# Patient Record
Sex: FEMALE | Race: WHITE
[De-identification: names, ages, dates, MRNs, and addresses within clinical notes are randomized per-mention and may not be internally consistent; named-entity substitution may affect disease eponyms.]

---

## 2018-04-12 ENCOUNTER — HOSPITAL ENCOUNTER (OUTPATIENT)
Dept: HOSPITAL 17 - CPRE | Age: 58
End: 2018-04-12
Attending: OBSTETRICS & GYNECOLOGY
Payer: COMMERCIAL

## 2018-04-12 DIAGNOSIS — Z01.812: ICD-10-CM

## 2018-04-12 DIAGNOSIS — Z01.810: Primary | ICD-10-CM

## 2018-04-12 DIAGNOSIS — R94.31: ICD-10-CM

## 2018-04-12 DIAGNOSIS — N81.5: ICD-10-CM

## 2018-04-12 LAB
ALBUMIN SERPL-MCNC: 4 GM/DL (ref 3.4–5)
ALP SERPL-CCNC: 87 U/L (ref 45–117)
ALT SERPL-CCNC: 31 U/L (ref 10–53)
AST SERPL-CCNC: 20 U/L (ref 15–37)
BACTERIA #/AREA URNS HPF: (no result) /HPF
BASOPHILS # BLD AUTO: 0 TH/MM3 (ref 0–0.2)
BASOPHILS NFR BLD: 0.4 % (ref 0–2)
BILIRUB SERPL-MCNC: 0.7 MG/DL (ref 0.2–1)
BUN SERPL-MCNC: 14 MG/DL (ref 7–18)
CALCIUM SERPL-MCNC: 9.5 MG/DL (ref 8.5–10.1)
CHLORIDE SERPL-SCNC: 103 MEQ/L (ref 98–107)
COLOR UR: (no result)
CREAT SERPL-MCNC: 0.71 MG/DL (ref 0.5–1)
EOSINOPHIL # BLD: 0.1 TH/MM3 (ref 0–0.4)
EOSINOPHIL NFR BLD: 2 % (ref 0–4)
ERYTHROCYTE [DISTWIDTH] IN BLOOD BY AUTOMATED COUNT: 12.8 % (ref 11.6–17.2)
GFR SERPLBLD BASED ON 1.73 SQ M-ARVRAT: 84 ML/MIN (ref 89–?)
GLUCOSE UR STRIP-MCNC: (no result) MG/DL
HCO3 BLD-SCNC: 31 MEQ/L (ref 21–32)
HCT VFR BLD CALC: 42.6 % (ref 35–46)
HGB BLD-MCNC: 14.5 GM/DL (ref 11.6–15.3)
HGB UR QL STRIP: (no result)
KETONES UR STRIP-MCNC: (no result) MG/DL
LYMPHOCYTES # BLD AUTO: 2.8 TH/MM3 (ref 1–4.8)
LYMPHOCYTES NFR BLD AUTO: 40.3 % (ref 9–44)
MCH RBC QN AUTO: 28.7 PG (ref 27–34)
MCHC RBC AUTO-ENTMCNC: 34.1 % (ref 32–36)
MCV RBC AUTO: 84.3 FL (ref 80–100)
MONOCYTE #: 0.3 TH/MM3 (ref 0–0.9)
MONOCYTES NFR BLD: 5.1 % (ref 0–8)
NEUTROPHILS # BLD AUTO: 3.6 TH/MM3 (ref 1.8–7.7)
NEUTROPHILS NFR BLD AUTO: 52.2 % (ref 16–70)
NITRITE UR QL STRIP: (no result)
PLATELET # BLD: 245 TH/MM3 (ref 150–450)
PMV BLD AUTO: 8.6 FL (ref 7–11)
PROT SERPL-MCNC: 7.9 GM/DL (ref 6.4–8.2)
RBC # BLD AUTO: 5.05 MIL/MM3 (ref 4–5.3)
SODIUM SERPL-SCNC: 140 MEQ/L (ref 136–145)
SP GR UR STRIP: 1 (ref 1–1.03)
SQUAMOUS #/AREA URNS HPF: 2 /HPF (ref 0–5)
URINE LEUKOCYTE ESTERASE: (no result)
WBC # BLD AUTO: 6.9 TH/MM3 (ref 4–11)

## 2018-04-12 PROCEDURE — 80053 COMPREHEN METABOLIC PANEL: CPT

## 2018-04-12 PROCEDURE — 85025 COMPLETE CBC W/AUTO DIFF WBC: CPT

## 2018-04-12 PROCEDURE — 93005 ELECTROCARDIOGRAM TRACING: CPT

## 2018-04-12 PROCEDURE — 36415 COLL VENOUS BLD VENIPUNCTURE: CPT

## 2018-04-12 PROCEDURE — 81001 URINALYSIS AUTO W/SCOPE: CPT

## 2018-04-12 NOTE — MH
cc:

Pedro Ramirez MD

****

 

 

DATE OF ADMISSION:

2018

 

REASON FOR ADMISSION:

Scheduled for admission on 2018 for posterior repair.

 

HISTORY OF PRESENT ILLNESS:

The patient is a 58-year-old white female,  4, para 4, status 

post prior hysterectomy and repair who has had issues with recurrent 

pelvic organ prolapse.  She typically has pressure and discomfort with

a bulge sensation.  She has stage II pelvic prolapse and wants to 

proceed with surgical correction.

 

PAST MEDICAL HISTORY:

Negative for heart, lung, liver disease, hypertension, diabetes or 

stroke.

 

PAST SURGICAL HISTORY:

Hysterectomy with repair.

 

OB HISTORY:

Four vaginal deliveries.

 

GYN HISTORY:

No STDs or abnormal Pap smears.  Hysterectomy was for a benign 

condition.

 

SOCIAL HISTORY:

She does not smoke, use alcohol or drugs.

 

FAMILY HISTORY:

Noncontributory.

 

ALLERGIES:

LATEX.

 

REVIEW OF SYSTEMS:

As above.  No chest pain, orthopnea or PND.  No nausea, vomiting, 

fever or chills.  No vaginal bleeding or discharge.

 

PHYSICAL EXAMINATION:

On exam, she is afebrile.  Vital signs are stable.  Height is 5 feet 6

inches, weight 250, BMI is 40.  Blood pressure is 120/70.

GENERAL:  The patient is alert and oriented in no acute distress.  No 

sign of cognitive dysfunction or depression.

HEENT:  Within normal limits.

NECK:  Supple.  No JVD.

CHEST:  Clear.

HEART:  Regular rate and rhythm.

ABDOMEN:  Soft, nontender.  No hepatosplenomegaly.  No CVA tenderness.

PELVIC:  Exam in the office shows Ap is at 0.  Further exam under 

anesthesia.

EXTREMITIES:  Warm.

SKIN:  Without rashes.

NEUROLOGIC:  Nonfocal.  No DVT signs.

 

ASSESSMENT:

Patient with pelvic organ prolapse stage II posterior compartment.

 

PLAN:

We discussed options for management and treatment.  She is aware of 

the risks, benefits and alternatives of the planned procedure 

including damage to surrounding organs, bleeding, infection, 

postoperative dyspareunia, damage to surrounding organs, failure of 

procedure.  She has made an informed choice to proceed.  Anticipate 

outpatient procedure.  We will use DVT prophylaxis with sequential 

compression device and antibiotic prophylaxis with Ancef 2 grams.  

Anticipate outpatient procedure.

 

 

 

 

__________________________________

MD RENITA Denton/DL

D: 2018, 10:42 AM

T: 2018, 11:04 AM

Visit #: [id number]

Job #: [id number]

## 2018-04-13 NOTE — EKG
Date Performed: 04/12/2018       Time Performed: 11:00:30

 

PTAGE:      58 years

 

EKG:      SINUS BRADYCARDIA POSSIBLE LEFT ATRIAL ENLARGEMENT BORDERLINE ECG 

 

NO PREVIOUS TRACING            

 

DOCTOR:   Silviano Ruiz  Interpretating Date/Time  04/13/2018 19:31:29

## 2018-04-18 ENCOUNTER — HOSPITAL ENCOUNTER (OUTPATIENT)
Dept: HOSPITAL 17 - HSDC | Age: 58
Discharge: HOME | End: 2018-04-18
Attending: OBSTETRICS & GYNECOLOGY
Payer: COMMERCIAL

## 2018-04-18 VITALS
TEMPERATURE: 98.6 F | SYSTOLIC BLOOD PRESSURE: 141 MMHG | OXYGEN SATURATION: 98 % | HEART RATE: 62 BPM | RESPIRATION RATE: 16 BRPM | DIASTOLIC BLOOD PRESSURE: 74 MMHG

## 2018-04-18 VITALS — WEIGHT: 250.67 LBS | HEIGHT: 66 IN | BODY MASS INDEX: 40.28 KG/M2

## 2018-04-18 DIAGNOSIS — N81.6: Primary | ICD-10-CM

## 2018-04-18 PROCEDURE — 57282 COLPOPEXY EXTRAPERITONEAL: CPT

## 2018-04-18 PROCEDURE — 57250 REPAIR RECTUM & VAGINA: CPT

## 2018-04-18 PROCEDURE — 00902 ANES ANORECTAL PX: CPT

## 2018-04-18 NOTE — MP
cc:

Pedro Ramirez MD

****

 

 

DATE OF OPERATION:

04/18/2018

 

PREOPERATIVE DIAGNOSIS:

Symptomatic rectocele, code N81.6.

 

POSTOPERATIVE DIAGNOSES:

Symptomatic rectocele, code N81.6 with a vaginal vault prolapse 

following prior hysterectomy, code N99.3.

 

PROCEDURE PERFORMED:

1.  Posterior repair of the enterocele, 74449.

2.  Extraperitoneal colpopexy, code 67940.

 

SURGEON:

Pedro Ramirez MD

 

ANESTHESIA:

General endotracheal.

 

ESTIMATED BLOOD LOSS:

20 mL.

 

URINE OUTPUT:

300 mL.

 

ASSISTANT:

[facility] staff x 2.

 

FLUIDS:

1000 mL crystalloid.

 

FINDINGS:

External genitalia poorly estrogenized.  POP-Q score Aa is -3, Ap is 

0, point C is -3,  total vaginal length is 10, genital hiatus is 6, 

perineal body is 5.

 

Following repair Aa is -3, Ap is -3, point C is -8, rectal exam normal

following repair.

 

SPECIMENS:

None.

 

COMPLICATIONS:

None.

 

DISPOSITION:

To recovery room stable.

 

COUNTS:

Needle and sponge counts correct.

 

DRAINS:

Yeh catheter.

 

ANTIBIOTIC PROPHYLAXIS:

Ancef 2 grams.

 

DVT PROPHYLAXIS:

Sequential compression devices.

 

TIME-OUT PROCEDURE:

The time-out procedure and identification per protocol.

 

SUMMARY OF INDICATIONS FOR THE PROCEDURE:

The patient has issues with symptomatic pelvic organ prolapse 

posterior compartment.

 

PROCEDURE:

The patient taken to the operating room theater identified, prepped 

and draped in a fashion appropriate for the planned procedure.  She 

was in the dorsal lithotomy position with careful attention paid to 

place her legs in stirrups to avoid undue stress to sensitive 

neurovascular structures.  The above findings noted.  Neurovascular 

integrity documented.  Yeh catheter was placed.

 

The perineal body was infiltrated with epinephrine and lidocaine 

solution.  This area was grasped with Allis clamps and then we went up

with sharp dissection to the apex of the vagina, reflected the rectum 

without complication.  It was seen that most of the pelvic organ 

prolapse was actually enterocele.  The enterocele was reduced without 

complication.  There was no damage to bowel contents.  A small 

rectocele was also repaired in standard fashion.  The vaginal mucosa 

was trimmed.  Exterior peritoneal suspension performed without 

difficulty.  This allowed us to get adequate vaginal length.  We 

closed the mucosa with a running Vicryl suture using a hemostatic 

matrix to obviate the need for packing.

 

Rectal exam confirms no damage to the rectum.  Above POP-Q score noted

with resolution of the preoperative defect.

 

The patient tolerated the procedure well and was taken to the recovery

room in stable condition.

 

 

 

 

 

__________________________________

MD RENITA Denton/ANALIA

D: 04/18/2018, 11:08 AM

T: 04/18/2018, 11:36 AM

Visit #: [id number]

Job #: [id number]